# Patient Record
Sex: FEMALE | Race: WHITE | Employment: FULL TIME | URBAN - METROPOLITAN AREA
[De-identification: names, ages, dates, MRNs, and addresses within clinical notes are randomized per-mention and may not be internally consistent; named-entity substitution may affect disease eponyms.]

---

## 2017-09-29 ENCOUNTER — TELEPHONE (OUTPATIENT)
Dept: HEMATOLOGY/ONCOLOGY | Facility: HOSPITAL | Age: 57
End: 2017-09-29

## 2017-09-29 NOTE — TELEPHONE ENCOUNTER
Called keeley again previous call was made 8/25 the patient said she was traveling out of town would call us back.  Called again today to follow up, no answer left a message asking her to call if she would like to follow up with us

## 2017-10-06 ENCOUNTER — TELEPHONE (OUTPATIENT)
Dept: HEMATOLOGY/ONCOLOGY | Facility: HOSPITAL | Age: 57
End: 2017-10-06

## 2017-10-06 NOTE — TELEPHONE ENCOUNTER
Andre Mckenna calling for mammo order. She has not seen Dr Rayna Kirk in two years. Should we make a appointment for her?  Thank you Nevaeh Horner

## 2017-10-16 ENCOUNTER — HOSPITAL ENCOUNTER (OUTPATIENT)
Dept: MAMMOGRAPHY | Facility: HOSPITAL | Age: 57
Discharge: HOME | End: 2017-10-16
Attending: INTERNAL MEDICINE
Payer: COMMERCIAL

## 2017-10-16 DIAGNOSIS — C50.411 BREAST CANCER OF UPPER-OUTER QUADRANT OF RIGHT FEMALE BREAST (HCC): ICD-10-CM

## 2017-10-16 PROCEDURE — 77066 DX MAMMO INCL CAD BI: CPT | Performed by: INTERNAL MEDICINE

## 2017-11-06 ENCOUNTER — OFFICE VISIT (OUTPATIENT)
Dept: HEMATOLOGY/ONCOLOGY | Facility: HOSPITAL | Age: 57
End: 2017-11-06
Attending: INTERNAL MEDICINE
Payer: COMMERCIAL

## 2017-11-06 VITALS
SYSTOLIC BLOOD PRESSURE: 159 MMHG | RESPIRATION RATE: 16 BRPM | TEMPERATURE: 99 F | WEIGHT: 162 LBS | BODY MASS INDEX: 24.55 KG/M2 | HEIGHT: 68 IN | HEART RATE: 75 BPM | DIASTOLIC BLOOD PRESSURE: 82 MMHG

## 2017-11-06 DIAGNOSIS — C50.411 MALIGNANT NEOPLASM OF UPPER-OUTER QUADRANT OF RIGHT BREAST IN FEMALE, ESTROGEN RECEPTOR POSITIVE (HCC): Primary | ICD-10-CM

## 2017-11-06 DIAGNOSIS — Z17.0 MALIGNANT NEOPLASM OF UPPER-OUTER QUADRANT OF RIGHT BREAST IN FEMALE, ESTROGEN RECEPTOR POSITIVE (HCC): Primary | ICD-10-CM

## 2017-11-06 DIAGNOSIS — K21.00 GASTROESOPHAGEAL REFLUX DISEASE WITH ESOPHAGITIS: ICD-10-CM

## 2018-03-10 NOTE — PROGRESS NOTES
MARYCHUY Salazar is a 59year old female with a history of Stage IIB, pT2 N1(mi) M0, hormone receptor positive, Her 2 kade negative, invasive carcinoma of the right breast. Patient comes with her genetic testing done by [facility]:        Postmenopausal 2004 after chemotherapy   Genitourinary: Positive for dyspareunia. Negative for dysuria and hematuria. Musculoskeletal:        Previous injury to right knee and left great toe   Skin: Negative.     Allergic/Immunologic: Po Comment: Procedure: ESOPHAGOGASTRODUODENOSCOPY,                COLONOSCOPY, POSSIBLE BIOPSY, POSSIBLE                POLYPECTOMY [id number];  Surgeon: Lucía Pineda MD;  Location: [address] [place] [address]  2/11/2016 Eyes: Conjunctivae are normal. Pupils are equal, round, and reactive to light. No scleral icterus. Neck: Normal range of motion. Neck supple. Cardiovascular: Normal rate, regular rhythm and normal heart sounds.     Pulmonary/Chest: Effort normal and radha COMPARISON: [facility], LENNY MURRAY W CAD NEYMAR, 10/23/2012, 10:50. [facility], 6/09/2009, 0:00. [facility], LENNY OUTSIDE DIGITIZED FILMS, 5/22/2008, 0:00.     [facility] Referrals:  [facility] SCREENING BILAT (AAH=[id number]) October 2018

## 2018-09-20 PROBLEM — S52.135D CLOSED NONDISPLACED FRACTURE OF NECK OF LEFT RADIUS WITH ROUTINE HEALING, SUBSEQUENT ENCOUNTER: Status: ACTIVE | Noted: 2018-09-20

## 2018-09-20 PROBLEM — S53.402D ELBOW SPRAIN, LEFT, SUBSEQUENT ENCOUNTER: Status: RESOLVED | Noted: 2018-09-20 | Resolved: 2018-09-20

## 2018-09-20 PROBLEM — S53.402D ELBOW SPRAIN, LEFT, SUBSEQUENT ENCOUNTER: Status: ACTIVE | Noted: 2018-09-20

## 2018-11-07 PROBLEM — K22.719 BARRETT'S ESOPHAGUS WITH DYSPLASIA: Status: ACTIVE | Noted: 2018-11-07

## 2018-11-07 PROCEDURE — 86803 HEPATITIS C AB TEST: CPT | Performed by: INTERNAL MEDICINE

## 2019-01-14 PROCEDURE — 88305 TISSUE EXAM BY PATHOLOGIST: CPT | Performed by: INTERNAL MEDICINE

## 2022-02-04 ENCOUNTER — OFFICE VISIT (OUTPATIENT)
Dept: UROLOGY | Facility: HOSPITAL | Age: 62
End: 2022-02-04
Attending: OBSTETRICS & GYNECOLOGY
Payer: COMMERCIAL

## 2022-02-04 VITALS
RESPIRATION RATE: 20 BRPM | SYSTOLIC BLOOD PRESSURE: 132 MMHG | DIASTOLIC BLOOD PRESSURE: 64 MMHG | BODY MASS INDEX: 23.04 KG/M2 | WEIGHT: 152 LBS | HEIGHT: 68 IN

## 2022-02-04 DIAGNOSIS — N81.84 PELVIC MUSCLE WASTING: ICD-10-CM

## 2022-02-04 DIAGNOSIS — N81.2 UTEROVAGINAL PROLAPSE, INCOMPLETE: Primary | ICD-10-CM

## 2022-02-04 DIAGNOSIS — R35.1 NOCTURIA: ICD-10-CM

## 2022-02-04 DIAGNOSIS — N95.2 POSTMENOPAUSAL ATROPHIC VAGINITIS: ICD-10-CM

## 2022-02-04 LAB
BLOOD URINE: NEGATIVE
CONTROL RUN WITHIN 24 HOURS?: YES
NITRITE URINE: NEGATIVE

## 2022-02-04 PROCEDURE — 99212 OFFICE O/P EST SF 10 MIN: CPT

## 2022-02-04 PROCEDURE — 87086 URINE CULTURE/COLONY COUNT: CPT | Performed by: OBSTETRICS & GYNECOLOGY

## 2022-02-04 PROCEDURE — 81002 URINALYSIS NONAUTO W/O SCOPE: CPT | Performed by: OBSTETRICS & GYNECOLOGY

## 2022-02-14 ENCOUNTER — OFFICE VISIT (OUTPATIENT)
Dept: UROLOGY | Facility: HOSPITAL | Age: 62
End: 2022-02-14
Attending: OBSTETRICS & GYNECOLOGY
Payer: COMMERCIAL

## 2022-02-14 VITALS — RESPIRATION RATE: 20 BRPM | WEIGHT: 152 LBS | HEIGHT: 68 IN | BODY MASS INDEX: 23.04 KG/M2

## 2022-02-14 DIAGNOSIS — N81.2 UTEROVAGINAL PROLAPSE, INCOMPLETE: Primary | ICD-10-CM

## 2022-02-14 PROCEDURE — 51797 INTRAABDOMINAL PRESSURE TEST: CPT

## 2022-02-14 PROCEDURE — 51784 ANAL/URINARY MUSCLE STUDY: CPT

## 2022-02-14 PROCEDURE — 51729 CYSTOMETROGRAM W/VP&UP: CPT

## 2022-02-14 PROCEDURE — 51741 ELECTRO-UROFLOWMETRY FIRST: CPT

## 2022-02-14 NOTE — PROCEDURES
Patient here for urodynamic testing. Procedure explained and confirmed by patient. See evaluation form for results. Both verbal and written discharge instructions were given. Patient tolerated procedure well and will follow up with Dr. Avis Crespo on 2022 for a UDS follow up. URODYNAMIC EVALUATION    PATIENT HISTORY:    Prolapse:  Yes( Denies splinting)  DANNY:  No  UUI:  No  Nocturia:  1  Frequency:  every 2-3 hours  Incomplete Emptying:  No  Constipation:  No( Bowel regimen reviewed. Bowel handouts provided)  Last void prior to UDS testin.5 hours  Current urge to void? Moderate  OAB meds stopped prior to test?  NA  Other symptoms? Surgery? []  No  [x]  Yes, specify date:  Interested in surgical options. Surgery not scheduled at the time of UDS testing. Surgical packet provided. Informed consent signed and scanned into patients chart. PATIENT DIAGNOSIS:  Uterovaginal Prolapse N81.2 (incomplete)    MEDICATION: Cholecalciferol (VITAMIN D) 2000 units Oral Tab, Take 2,000 Units by mouth daily. , Disp: 30 tablet, Rfl: 0  Methylcellulose, Laxative, (CITRUCEL FIBERSHAKE OR), Take by mouth., Disp: , Rfl:   Calcium-Phosphorus-Vitamin D (CITRACAL +D3 OR), Take 2 tablets by mouth daily. , Disp: , Rfl:   CENTRUM SILVER OR TABS, 1 TABLET DAILY, Disp: , Rfl:          ALLERGIES:  Alc-Benzyl Alc-Sulfamethoxazole-Trimethoprim, Sulfamethoxazole, and Trimethoprim      EXAM:  Urinalysis Dip:Blood- Negative, Nitrates- Negative, Leukocytes- Negaive     Urovesico Junction ( >30 degrees ):  [x]  Mobile  []  Fixed    Perineal Sensation:  [x]  Normal  []  Abnormal    Additional Notes:    PROLAPSE (past introitus):  [x]  Yes  []  No  Prolapse reduced for testing?   [x]  Yes  []  No  []  Pessary  [x]  Speculum  []  Proctoswab  []  Vag Packing    Additional Notes:    UROFLOWMETRY:  Voided Volume:            139               mL  Maximum Flow Rate:                 13              mL/sec  Average flow rate: 5            mL/sec  Post-void Residual:            10            mL  Pattern:  []  Normal  []  Poor flow     [x]  Intermittent  []  Other  Void:   [x]  Typical  []  Atypical    Additional Notes:    CYSTOMETRY:  Urethral Catheter:  Fr 7 / tdoc  Abd Catheter:     Fr 7 / tdoc   Infusion:  Water Rate 50 mL/min  Temp:  Room  Position:  [x]  Sit  []  Stand  []  Supine  First sensation:   65 mL  First desire to void:   146        mL  Strong desire to void:  271 mL  Maximum cystometric capacity:   400 mL  Detrusor Activity:  []  Unstable   [x]  Stable  Urge leakage? []  Yes [x]  No  Volume at 1st unhibited detrusor cont:   Na mL  Detrusor instability provoked by:    []  Spontaneous  []  Coughing  []  Filling  []  Valsalva  []  Other    Additional Notes:      URETHRAL FUNCTION:  Valsava (vesical) Leak Point Pressures:  REDUCED:half speculum  Volume Leak Point Pressure Leak? Cough Valsalva      100mL 90 113    cm H2O []  Yes [x]  No   200mL 87 116    cm H2O []  Yes [x]  No   300mL 85 105    cm H2O []  Yes [x]  No   400mL 83 121 cm H2O []  Yes [x]  No       Genuine Stress Incontinence demonstrated? []  Yes  [x]  No    Resting Urethral Pressure Profile:     Functional Urethral Length:      0.5    cm             0.4    cm     Maximum UCP:          29 cm          63   cm       PRESSURE/FLOW STUDY:    Voided volume:   339 + (Missed commode)     mL  Maximum flow rate:      11  mL/sec  Pressure Detrusor (at maximum flow):        50    cm H2O  Post void residual:         5      mL  Voiding mechanism:  []  Abnormal  []  Normal  []  Strain to void   []  Weak detrusor  Void:   [x]  Typical   []  Atypical    Additional Notes:  Flow study obtained unreduced  EMG:  [x]  Reactive []  Non-Reactive    2/14/2022 1:57 PM     PERFORMED BY:  Fatimah Tran RN      URODYNAMIC PHYSICIAN INTERPRETATION    IMPRESSION:   139/10cc & 339/5cc  long term 400cc  No DO  No DANNY    Post-Procedure Diagnoses: 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

## 2022-02-25 ENCOUNTER — OFFICE VISIT (OUTPATIENT)
Dept: UROLOGY | Facility: HOSPITAL | Age: 62
End: 2022-02-25
Attending: OBSTETRICS & GYNECOLOGY
Payer: COMMERCIAL

## 2022-02-25 VITALS — HEIGHT: 68 IN | WEIGHT: 158 LBS | TEMPERATURE: 98 F | BODY MASS INDEX: 23.95 KG/M2 | RESPIRATION RATE: 20 BRPM

## 2022-02-25 DIAGNOSIS — N36.41 URETHRAL HYPERMOBILITY: ICD-10-CM

## 2022-02-25 DIAGNOSIS — N81.2 UTEROVAGINAL PROLAPSE, INCOMPLETE: Primary | ICD-10-CM

## 2022-02-25 PROCEDURE — 99212 OFFICE O/P EST SF 10 MIN: CPT

## 2022-02-25 NOTE — PROGRESS NOTES
Pt presents w/ initial c/o bulge  Urodynamic testing undergone without complication. Results reviewed with patient  139/10cc & 339/5cc  senior living 400cc  No DO  No DANNY    Discussed with patient mgmt options for bulge  Biggest bother is bulge    Thorough discussion of surgical risks, benefits, and alternatives including, but not limited to bleeding/clots, infection, injury to nearby organs (urethra, bladder, ureters, bowel, blood vessels), mesh erosion, dyspareunia, de michelle UUI, worsening DANNY, recurrence, voiding dysfunction, and pain. Discussed pain mgmt and potential need for narcotics. Discussed addiction potential with narcotics. IL  reviewed. All questions answered.   Pt will proceed TVH poss BSO USVVS APE Repair, cysto    Follow up two weeks post op    Dr. Danny Tarango    Pre op packet provided

## 2022-04-01 ENCOUNTER — LAB ENCOUNTER (OUTPATIENT)
Dept: LAB | Age: 62
End: 2022-04-01
Attending: OBSTETRICS & GYNECOLOGY
Payer: COMMERCIAL

## 2022-04-01 DIAGNOSIS — Z01.818 PREOP TESTING: ICD-10-CM

## 2022-04-01 LAB
ANTIBODY SCREEN: NEGATIVE
RH BLOOD TYPE: POSITIVE
RH BLOOD TYPE: POSITIVE

## 2022-04-01 PROCEDURE — 36415 COLL VENOUS BLD VENIPUNCTURE: CPT

## 2022-04-01 PROCEDURE — 86901 BLOOD TYPING SEROLOGIC RH(D): CPT

## 2022-04-01 PROCEDURE — 86850 RBC ANTIBODY SCREEN: CPT

## 2022-04-01 PROCEDURE — 86900 BLOOD TYPING SEROLOGIC ABO: CPT

## 2022-04-02 LAB — SARS-COV-2 RNA RESP QL NAA+PROBE: NOT DETECTED

## 2022-04-03 NOTE — H&P
60 y/o female with uterovaginal prolapse for surgical mgmt  Denies DANNY  H/o breast ca s/p tamoxifen (genetic testing)    Pre operative clearance with Dr Federico Franco, pt seen & examined without changes. Thorough discussion of surgical risks, benefits, and alternatives including, but not limited to bleeding/clots, infection, injury to nearby organs (urethra, bladder, ureters, bowel, blood vessels),  dyspareunia, de michelle UI, worsening UI, recurrence, voiding dysfunction, and pain. Discussed pain mgmt and potential need for narcotics. Discussed addiction potential with narcotics. IL  reviewed. Plan to proceed with TVH poss BSO USVVS APE Repair, cysto as consented  All questions answered.    [facility]  [phone]

## 2022-04-04 ENCOUNTER — HOSPITAL ENCOUNTER (OUTPATIENT)
Facility: HOSPITAL | Age: 62
Discharge: HOME | End: 2022-04-05
Attending: OBSTETRICS & GYNECOLOGY | Admitting: OBSTETRICS & GYNECOLOGY
Payer: COMMERCIAL

## 2022-04-04 ENCOUNTER — ANESTHESIA (OUTPATIENT)
Dept: SURGERY | Facility: HOSPITAL | Age: 62
End: 2022-04-04
Payer: COMMERCIAL

## 2022-04-04 ENCOUNTER — ANESTHESIA EVENT (OUTPATIENT)
Dept: SURGERY | Facility: HOSPITAL | Age: 62
End: 2022-04-04
Payer: COMMERCIAL

## 2022-04-04 DIAGNOSIS — Z01.818 PREOP TESTING: Primary | ICD-10-CM

## 2022-04-04 DIAGNOSIS — N81.4 UTEROVAGINAL PROLAPSE: ICD-10-CM

## 2022-04-04 PROBLEM — N81.2 UTEROVAGINAL PROLAPSE, INCOMPLETE: Status: ACTIVE | Noted: 2022-04-04

## 2022-04-04 PROCEDURE — 0USG7ZZ REPOSITION VAGINA, VIA NATURAL OR ARTIFICIAL OPENING: ICD-10-PCS | Performed by: OBSTETRICS & GYNECOLOGY

## 2022-04-04 PROCEDURE — 88307 TISSUE EXAM BY PATHOLOGIST: CPT | Performed by: OBSTETRICS & GYNECOLOGY

## 2022-04-04 PROCEDURE — 0UT97ZZ RESECTION OF UTERUS, VIA NATURAL OR ARTIFICIAL OPENING: ICD-10-PCS | Performed by: OBSTETRICS & GYNECOLOGY

## 2022-04-04 PROCEDURE — 0UT77ZZ RESECTION OF BILATERAL FALLOPIAN TUBES, VIA NATURAL OR ARTIFICIAL OPENING: ICD-10-PCS | Performed by: OBSTETRICS & GYNECOLOGY

## 2022-04-04 PROCEDURE — 0TJB8ZZ INSPECTION OF BLADDER, VIA NATURAL OR ARTIFICIAL OPENING ENDOSCOPIC: ICD-10-PCS | Performed by: OBSTETRICS & GYNECOLOGY

## 2022-04-04 PROCEDURE — 0JQC0ZZ REPAIR PELVIC REGION SUBCUTANEOUS TISSUE AND FASCIA, OPEN APPROACH: ICD-10-PCS | Performed by: OBSTETRICS & GYNECOLOGY

## 2022-04-04 PROCEDURE — 0UT27ZZ RESECTION OF BILATERAL OVARIES, VIA NATURAL OR ARTIFICIAL OPENING: ICD-10-PCS | Performed by: OBSTETRICS & GYNECOLOGY

## 2022-04-04 RX ORDER — BUPIVACAINE HYDROCHLORIDE AND EPINEPHRINE 2.5; 5 MG/ML; UG/ML
INJECTION, SOLUTION INFILTRATION; PERINEURAL AS NEEDED
Status: DISCONTINUED | OUTPATIENT
Start: 2022-04-04 | End: 2022-04-04 | Stop reason: HOSPADM

## 2022-04-04 RX ORDER — HYDROMORPHONE HYDROCHLORIDE 1 MG/ML
0.2 INJECTION, SOLUTION INTRAMUSCULAR; INTRAVENOUS; SUBCUTANEOUS EVERY 5 MIN PRN
Status: DISCONTINUED | OUTPATIENT
Start: 2022-04-04 | End: 2022-04-04 | Stop reason: HOSPADM

## 2022-04-04 RX ORDER — HYDROMORPHONE HYDROCHLORIDE 1 MG/ML
0.4 INJECTION, SOLUTION INTRAMUSCULAR; INTRAVENOUS; SUBCUTANEOUS EVERY 2 HOUR PRN
Status: DISCONTINUED | OUTPATIENT
Start: 2022-04-04 | End: 2022-04-05

## 2022-04-04 RX ORDER — SODIUM CHLORIDE, SODIUM LACTATE, POTASSIUM CHLORIDE, CALCIUM CHLORIDE 600; 310; 30; 20 MG/100ML; MG/100ML; MG/100ML; MG/100ML
INJECTION, SOLUTION INTRAVENOUS CONTINUOUS
Status: DISCONTINUED | OUTPATIENT
Start: 2022-04-04 | End: 2022-04-05

## 2022-04-04 RX ORDER — CEFAZOLIN SODIUM/WATER 2 G/20 ML
2 SYRINGE (ML) INTRAVENOUS ONCE
Status: COMPLETED | OUTPATIENT
Start: 2022-04-04 | End: 2022-04-04

## 2022-04-04 RX ORDER — SODIUM CHLORIDE, SODIUM LACTATE, POTASSIUM CHLORIDE, CALCIUM CHLORIDE 600; 310; 30; 20 MG/100ML; MG/100ML; MG/100ML; MG/100ML
INJECTION, SOLUTION INTRAVENOUS CONTINUOUS
Status: DISCONTINUED | OUTPATIENT
Start: 2022-04-04 | End: 2022-04-04 | Stop reason: HOSPADM

## 2022-04-04 RX ORDER — DEXAMETHASONE SODIUM PHOSPHATE 4 MG/ML
VIAL (ML) INJECTION AS NEEDED
Status: DISCONTINUED | OUTPATIENT
Start: 2022-04-04 | End: 2022-04-04 | Stop reason: SURG

## 2022-04-04 RX ORDER — ROCURONIUM BROMIDE 10 MG/ML
INJECTION, SOLUTION INTRAVENOUS AS NEEDED
Status: DISCONTINUED | OUTPATIENT
Start: 2022-04-04 | End: 2022-04-04 | Stop reason: SURG

## 2022-04-04 RX ORDER — HYDROCODONE BITARTRATE AND ACETAMINOPHEN 5; 325 MG/1; MG/1
1 TABLET ORAL EVERY 4 HOURS PRN
Status: DISCONTINUED | OUTPATIENT
Start: 2022-04-04 | End: 2022-04-05

## 2022-04-04 RX ORDER — LIDOCAINE HYDROCHLORIDE 10 MG/ML
INJECTION, SOLUTION EPIDURAL; INFILTRATION; INTRACAUDAL; PERINEURAL AS NEEDED
Status: DISCONTINUED | OUTPATIENT
Start: 2022-04-04 | End: 2022-04-04 | Stop reason: SURG

## 2022-04-04 RX ORDER — ONDANSETRON 2 MG/ML
4 INJECTION INTRAMUSCULAR; INTRAVENOUS EVERY 8 HOURS PRN
Status: DISCONTINUED | OUTPATIENT
Start: 2022-04-04 | End: 2022-04-05

## 2022-04-04 RX ORDER — HYDROCODONE BITARTRATE AND ACETAMINOPHEN 5; 325 MG/1; MG/1
1 TABLET ORAL AS NEEDED
Status: DISCONTINUED | OUTPATIENT
Start: 2022-04-04 | End: 2022-04-04 | Stop reason: HOSPADM

## 2022-04-04 RX ORDER — HALOPERIDOL 5 MG/ML
0.25 INJECTION INTRAMUSCULAR ONCE AS NEEDED
Status: DISCONTINUED | OUTPATIENT
Start: 2022-04-04 | End: 2022-04-04 | Stop reason: HOSPADM

## 2022-04-04 RX ORDER — FLUTICASONE PROPIONATE 50 MCG
1 SPRAY, SUSPENSION (ML) NASAL DAILY PRN
Status: DISCONTINUED | OUTPATIENT
Start: 2022-04-04 | End: 2022-04-05

## 2022-04-04 RX ORDER — ONDANSETRON 2 MG/ML
INJECTION INTRAMUSCULAR; INTRAVENOUS AS NEEDED
Status: DISCONTINUED | OUTPATIENT
Start: 2022-04-04 | End: 2022-04-04 | Stop reason: SURG

## 2022-04-04 RX ORDER — ONDANSETRON 2 MG/ML
4 INJECTION INTRAMUSCULAR; INTRAVENOUS ONCE AS NEEDED
Status: DISCONTINUED | OUTPATIENT
Start: 2022-04-04 | End: 2022-04-04 | Stop reason: HOSPADM

## 2022-04-04 RX ORDER — MORPHINE SULFATE 4 MG/ML
4 INJECTION, SOLUTION INTRAMUSCULAR; INTRAVENOUS EVERY 10 MIN PRN
Status: DISCONTINUED | OUTPATIENT
Start: 2022-04-04 | End: 2022-04-04 | Stop reason: HOSPADM

## 2022-04-04 RX ORDER — EPHEDRINE SULFATE 50 MG/ML
INJECTION, SOLUTION INTRAVENOUS AS NEEDED
Status: DISCONTINUED | OUTPATIENT
Start: 2022-04-04 | End: 2022-04-04 | Stop reason: SURG

## 2022-04-04 RX ORDER — HYDROMORPHONE HYDROCHLORIDE 1 MG/ML
0.8 INJECTION, SOLUTION INTRAMUSCULAR; INTRAVENOUS; SUBCUTANEOUS EVERY 2 HOUR PRN
Status: DISCONTINUED | OUTPATIENT
Start: 2022-04-04 | End: 2022-04-05

## 2022-04-04 RX ORDER — KETOROLAC TROMETHAMINE 30 MG/ML
INJECTION, SOLUTION INTRAMUSCULAR; INTRAVENOUS AS NEEDED
Status: DISCONTINUED | OUTPATIENT
Start: 2022-04-04 | End: 2022-04-04 | Stop reason: SURG

## 2022-04-04 RX ORDER — NEOSTIGMINE METHYLSULFATE 1 MG/ML
INJECTION INTRAVENOUS AS NEEDED
Status: DISCONTINUED | OUTPATIENT
Start: 2022-04-04 | End: 2022-04-04 | Stop reason: SURG

## 2022-04-04 RX ORDER — MORPHINE SULFATE 4 MG/ML
2 INJECTION, SOLUTION INTRAMUSCULAR; INTRAVENOUS EVERY 10 MIN PRN
Status: DISCONTINUED | OUTPATIENT
Start: 2022-04-04 | End: 2022-04-04 | Stop reason: HOSPADM

## 2022-04-04 RX ORDER — NALOXONE HYDROCHLORIDE 0.4 MG/ML
80 INJECTION, SOLUTION INTRAMUSCULAR; INTRAVENOUS; SUBCUTANEOUS AS NEEDED
Status: DISCONTINUED | OUTPATIENT
Start: 2022-04-04 | End: 2022-04-04 | Stop reason: HOSPADM

## 2022-04-04 RX ORDER — HYDROMORPHONE HYDROCHLORIDE 1 MG/ML
0.6 INJECTION, SOLUTION INTRAMUSCULAR; INTRAVENOUS; SUBCUTANEOUS EVERY 5 MIN PRN
Status: DISCONTINUED | OUTPATIENT
Start: 2022-04-04 | End: 2022-04-04 | Stop reason: HOSPADM

## 2022-04-04 RX ORDER — ACETAMINOPHEN 500 MG
1000 TABLET ORAL ONCE
Status: COMPLETED | OUTPATIENT
Start: 2022-04-04 | End: 2022-04-04

## 2022-04-04 RX ORDER — GLYCOPYRROLATE 0.2 MG/ML
INJECTION, SOLUTION INTRAMUSCULAR; INTRAVENOUS AS NEEDED
Status: DISCONTINUED | OUTPATIENT
Start: 2022-04-04 | End: 2022-04-04 | Stop reason: SURG

## 2022-04-04 RX ORDER — HYDROCODONE BITARTRATE AND ACETAMINOPHEN 5; 325 MG/1; MG/1
2 TABLET ORAL AS NEEDED
Status: DISCONTINUED | OUTPATIENT
Start: 2022-04-04 | End: 2022-04-04 | Stop reason: HOSPADM

## 2022-04-04 RX ORDER — HYDROMORPHONE HYDROCHLORIDE 1 MG/ML
0.4 INJECTION, SOLUTION INTRAMUSCULAR; INTRAVENOUS; SUBCUTANEOUS EVERY 5 MIN PRN
Status: DISCONTINUED | OUTPATIENT
Start: 2022-04-04 | End: 2022-04-04 | Stop reason: HOSPADM

## 2022-04-04 RX ORDER — ONDANSETRON 4 MG/1
4 TABLET, FILM COATED ORAL EVERY 8 HOURS PRN
Status: DISCONTINUED | OUTPATIENT
Start: 2022-04-04 | End: 2022-04-05

## 2022-04-04 RX ORDER — MORPHINE SULFATE 10 MG/ML
6 INJECTION, SOLUTION INTRAMUSCULAR; INTRAVENOUS EVERY 10 MIN PRN
Status: DISCONTINUED | OUTPATIENT
Start: 2022-04-04 | End: 2022-04-04 | Stop reason: HOSPADM

## 2022-04-04 RX ADMIN — SODIUM CHLORIDE, SODIUM LACTATE, POTASSIUM CHLORIDE, CALCIUM CHLORIDE: 600; 310; 30; 20 INJECTION, SOLUTION INTRAVENOUS at 08:56:00

## 2022-04-04 RX ADMIN — EPHEDRINE SULFATE 10 MG: 50 INJECTION, SOLUTION INTRAVENOUS at 08:06:00

## 2022-04-04 RX ADMIN — SODIUM CHLORIDE, SODIUM LACTATE, POTASSIUM CHLORIDE, CALCIUM CHLORIDE: 600; 310; 30; 20 INJECTION, SOLUTION INTRAVENOUS at 08:57:00

## 2022-04-04 RX ADMIN — ROCURONIUM BROMIDE 40 MG: 10 INJECTION, SOLUTION INTRAVENOUS at 07:36:00

## 2022-04-04 RX ADMIN — KETOROLAC TROMETHAMINE 30 MG: 30 INJECTION, SOLUTION INTRAMUSCULAR; INTRAVENOUS at 08:59:00

## 2022-04-04 RX ADMIN — DEXAMETHASONE SODIUM PHOSPHATE 8 MG: 4 MG/ML VIAL (ML) INJECTION at 07:48:00

## 2022-04-04 RX ADMIN — SODIUM CHLORIDE, SODIUM LACTATE, POTASSIUM CHLORIDE, CALCIUM CHLORIDE: 600; 310; 30; 20 INJECTION, SOLUTION INTRAVENOUS at 08:24:00

## 2022-04-04 RX ADMIN — ONDANSETRON 4 MG: 2 INJECTION INTRAMUSCULAR; INTRAVENOUS at 09:00:00

## 2022-04-04 RX ADMIN — EPHEDRINE SULFATE 10 MG: 50 INJECTION, SOLUTION INTRAVENOUS at 08:15:00

## 2022-04-04 RX ADMIN — SODIUM CHLORIDE, SODIUM LACTATE, POTASSIUM CHLORIDE, CALCIUM CHLORIDE: 600; 310; 30; 20 INJECTION, SOLUTION INTRAVENOUS at 07:31:00

## 2022-04-04 RX ADMIN — NEOSTIGMINE METHYLSULFATE 4 MG: 1 INJECTION INTRAVENOUS at 09:08:00

## 2022-04-04 RX ADMIN — CEFAZOLIN SODIUM/WATER 2 G: 2 G/20 ML SYRINGE (ML) INTRAVENOUS at 07:46:00

## 2022-04-04 RX ADMIN — GLYCOPYRROLATE 0.8 MG: 0.2 INJECTION, SOLUTION INTRAMUSCULAR; INTRAVENOUS at 09:08:00

## 2022-04-04 RX ADMIN — LIDOCAINE HYDROCHLORIDE 50 MG: 10 INJECTION, SOLUTION EPIDURAL; INFILTRATION; INTRACAUDAL; PERINEURAL at 07:36:00

## 2022-04-04 RX ADMIN — SODIUM CHLORIDE, SODIUM LACTATE, POTASSIUM CHLORIDE, CALCIUM CHLORIDE: 600; 310; 30; 20 INJECTION, SOLUTION INTRAVENOUS at 07:56:00

## 2022-04-04 NOTE — OPERATIVE REPORT
PRE-OP DIAGNOSIS:  Uterovaginal prolapse, h/o breast ca    POST-OP DIAGNOSIS:  Same    PROCEDURE:  Transvaginal hysterectomy, bilateral salingo-oophorectomy, Repair of enterocele; uterosacral ligament suspension; anterior colporrhaphy; posterior colporrhaphy; cystourethroscopy    SURGEON:  Abdulaziz Moore DO    ASSISTANT:  Roel    ANESTHESIA:  General    EBL:  200 ml  UOP: 099HP    No complications  Specimen: uterus, cervix, bilateral fallopian tubes and ovaries    Findings: bilateral ureteral efflux, no evidence of bladder, ureteral, or urethral injury. Hemostasis upon completion. PROCEDURE:  The patient was taken to the operating room, with IV running after informed consent was obtained. She was placed in the supine position and induced under general anesthesia. The patient was then placed in the dorsal lithotomy position and prepped and draped in the usual sterile fashion. The Bookwalter retractor was utilized and retraction was placed into the vagina. The cervix was grasped with tenacula and cautery was used to make a circumferential incision after infiltration local solution (.25% marcaine with epinepherine). The anterior and posterior cul-de-sacs were entered sharply and then the uterosacral cardinal ligament complexes were clamped, cut and suture ligated bilaterally. The fundus of the uterus was delivered posteriorly and then the uteroovarian complexes were clamped and cut, freeing the specimen. These pedicles were then also suture ligated. A lap sponge was placed into the peritoneal cavity and inspection revealed normal appearing tubes and ovaries bilaterally. Each adnexa was grasped with Dille clamp, transected, and doubly ligated. Hemostasis was noted. Specimen was sent to pathology, uterus, cervix, bilateral fallopian tubes and ovaries.     Due to the presence of significant apical prolapse and associated enterocele, it was necessary to proceed with suspension of the vaginal apex and enterocele repair. The uterosacral ligaments were identified and a Montejo stitch was placed and tagged to repair the enterocele. This suture was placed through the posterior vaginal wall, through the left uterosacral ligament, across the anterior rectum, through the right uterosacral ligament and then back through the posterior vaginal wall. Two uterosacral suspension stitches were then placed on each side of the pelvis. These were placed high on the uterosacral ligaments at and just proximal to the ischial spine. The uterosacral suspension stitches were then placed on each side of the pelvis for apical suspension. Once these sutures were placed, the urethra was dilated to accommodate a 21 Haitian caliber cystoscope sheath and cystoscopy was undertaken. Cystoscopy confirmed that there had been no injury to the bladder. In addition, urine was seen from both ureteral orifices. The cut edge of the anterior vaginal epithelium was then grasped with Allis clamps and the midline incision was made after infiltration with local solution (.25%marcaine with epinepherine). Sharp dissection with Metzenbaum scissors was performed to dissect the redundant anterior vaginal epithelium from the underlying endopelvic connective tissue of the bladder. 0 Vicryl suture was then used to plicate the endopelvic connective tissue, obliterating the cystocele. The excess vaginal epithelium was excised and the midline incision was then closed with 2-0 Vicryl suture in running locking fashion. Hemostasis was noted. The lap sponge was removed from the peritoneal cavity, FloSeal was used to aid in hemostasis, hemostasis confirmed,  and the vaginal apex was then closed with 2-0 Vicryl suture in a running locking fashion. The supporting stitches were then tied down resulting in satisfactory elevation of the vaginal apex. The bladder was emptied, cystoscopy was performed and urine was again seen from both ureteral orifices. There was no evidence of bladder, ureteral, or urethral injury. Attention was then directed to the posterior compartment. The redundant posterior vaginal epithelium and perineal skin was excised after infiltration with . 25% marcaine with epinepherine. 0 Vicryl suture was then used to plicate the rectovaginal connective tissue, obliterating the rectocele. Additional sutures of 0 Vicryl were utilized to reconstruct the perineal body. Rectal examination confirmed that none of these sutures had impinged the rectum. 2-0 Vicryl sutures were then used to reapproximate the vaginal epithelium in the midline in a running locking fashion. Inspection at this point revealed a well-supported vaginal apex, anterior, and posterior compartments, with good hemostasis. A small vaginal packing was placed. A Blanchard catheter was placed transurethrally. The patient was then removed from the dorsal lithotomy position, awakened and extubated and transferred from the operating room to the recovery room in stable  condition, having tolerated the procedure well. Sponge, lap, and needle counts were correct.

## 2022-04-04 NOTE — ANESTHESIA PROCEDURE NOTES
Airway  Date/Time: 4/4/2022 7:40 AM  Urgency: Elective    Difficult airway    General Information and Staff    Patient location during procedure: OR  Anesthesiologist: Geoff Moore DO  Performed: anesthesiologist     Indications and Patient Condition  Indications for airway management: anesthesia  Sedation level: deep  Preoxygenated: yes  Patient position: sniffing  Mask difficulty assessment: 1 - vent by mask    Final Airway Details  Final airway type: endotracheal airway      Successful airway: ETT  Cuffed: yes   Successful intubation technique: direct laryngoscopy  Facilitating devices/methods: bougie  Endotracheal tube insertion site: oral  Blade: Gema  Blade size: #3  ETT size (mm): 7.5    Cormack-Lehane Classification: grade III - view of epiglottis only  Placement verified by: chest auscultation and capnometry   Measured from: lips  ETT to lips (cm): 21  Number of attempts at approach: 1  Number of other approaches attempted: 1    Other Attempts  Unsuccessful attempted endotracheal techniques: direct laryngoscopy    Additional Comments  Laryngoscopy = Grade 3 view, blue bougie used x1 no difficulty.  Ett advanced , BBS = and +etco2

## 2022-04-05 VITALS
TEMPERATURE: 98 F | HEART RATE: 75 BPM | OXYGEN SATURATION: 100 % | BODY MASS INDEX: 22.8 KG/M2 | RESPIRATION RATE: 18 BRPM | WEIGHT: 147 LBS | HEIGHT: 67.5 IN | SYSTOLIC BLOOD PRESSURE: 102 MMHG | DIASTOLIC BLOOD PRESSURE: 78 MMHG

## 2022-04-05 LAB
BASOPHILS # BLD AUTO: 0.02 X10(3) UL (ref 0–0.2)
BASOPHILS NFR BLD AUTO: 0.3 %
DEPRECATED RDW RBC AUTO: 45.1 FL (ref 35.1–46.3)
EOSINOPHIL # BLD AUTO: 0.08 X10(3) UL (ref 0–0.7)
EOSINOPHIL NFR BLD AUTO: 1.3 %
ERYTHROCYTE [DISTWIDTH] IN BLOOD BY AUTOMATED COUNT: 12.9 % (ref 11–15)
HCT VFR BLD AUTO: 30.6 %
HGB BLD-MCNC: 9.8 G/DL
IMM GRANULOCYTES # BLD AUTO: 0.02 X10(3) UL (ref 0–1)
IMM GRANULOCYTES NFR BLD: 0.3 %
LYMPHOCYTES # BLD AUTO: 1.21 X10(3) UL (ref 1–4)
LYMPHOCYTES NFR BLD AUTO: 19.2 %
MCH RBC QN AUTO: 30.4 PG (ref 26–34)
MCHC RBC AUTO-ENTMCNC: 32 G/DL (ref 31–37)
MCV RBC AUTO: 95 FL
MONOCYTES # BLD AUTO: 0.63 X10(3) UL (ref 0.1–1)
MONOCYTES NFR BLD AUTO: 10 %
NEUTROPHILS # BLD AUTO: 4.34 X10 (3) UL (ref 1.5–7.7)
NEUTROPHILS # BLD AUTO: 4.34 X10(3) UL (ref 1.5–7.7)
NEUTROPHILS NFR BLD AUTO: 68.9 %
PLATELET # BLD AUTO: 115 10(3)UL (ref 150–450)
RBC # BLD AUTO: 3.22 X10(6)UL
WBC # BLD AUTO: 6.3 X10(3) UL (ref 4–11)

## 2022-04-05 PROCEDURE — 85025 COMPLETE CBC W/AUTO DIFF WBC: CPT | Performed by: OBSTETRICS & GYNECOLOGY

## 2022-04-05 RX ORDER — IBUPROFEN 600 MG/1
600 TABLET ORAL EVERY 6 HOURS PRN
Status: DISCONTINUED | OUTPATIENT
Start: 2022-04-05 | End: 2022-04-05

## 2022-04-05 RX ORDER — HYDROCODONE BITARTRATE AND ACETAMINOPHEN 5; 325 MG/1; MG/1
1 TABLET ORAL EVERY 4 HOURS PRN
Qty: 20 TABLET | Refills: 0 | Status: SHIPPED | OUTPATIENT
Start: 2022-04-05

## 2022-04-05 RX ORDER — IBUPROFEN 600 MG/1
600 TABLET ORAL EVERY 6 HOURS PRN
Qty: 30 TABLET | Refills: 3 | Status: SHIPPED | OUTPATIENT
Start: 2022-04-05

## 2022-04-06 ENCOUNTER — TELEPHONE (OUTPATIENT)
Dept: UROLOGY | Facility: CLINIC | Age: 62
End: 2022-04-06

## 2022-04-06 NOTE — TELEPHONE ENCOUNTER
TC to pt following surgery  Doing well, no complaints  Denies CP or SOB  Pain well controlled with PO meds (IBP & norco)  Voids freely, denies sense of incomplete bladder emptying   Awaiting BM  No heavy vaginal bleeding, some spotting  Tolerates ambulation, PO diet  Reviewed bowel mgmt and activity restrictions  All questions answered  Encouraged her to call with questions or concerns  Follow up as scheduled, sooner prn   She understands and agrees to plan

## 2022-04-07 NOTE — TELEPHONE ENCOUNTER
TC to pt with benign path results  Feeling better every day  Voids freely  +BM  Encouraged her to call with questions or concerns  F/u as scheduled

## 2022-04-22 ENCOUNTER — OFFICE VISIT (OUTPATIENT)
Dept: UROLOGY | Facility: HOSPITAL | Age: 62
End: 2022-04-22
Attending: OBSTETRICS & GYNECOLOGY
Payer: COMMERCIAL

## 2022-04-22 VITALS — TEMPERATURE: 99 F | WEIGHT: 147 LBS | HEIGHT: 67.5 IN | BODY MASS INDEX: 22.8 KG/M2 | RESPIRATION RATE: 20 BRPM

## 2022-04-22 DIAGNOSIS — Z98.890 POST-OPERATIVE STATE: Primary | ICD-10-CM

## 2022-04-22 PROCEDURE — 99212 OFFICE O/P EST SF 10 MIN: CPT

## 2022-07-29 ENCOUNTER — OFFICE VISIT (OUTPATIENT)
Dept: UROLOGY | Facility: HOSPITAL | Age: 62
End: 2022-07-29
Attending: OBSTETRICS & GYNECOLOGY
Payer: COMMERCIAL

## 2022-07-29 VITALS — RESPIRATION RATE: 16 BRPM | WEIGHT: 147 LBS | HEIGHT: 68 IN | BODY MASS INDEX: 22.28 KG/M2

## 2022-07-29 DIAGNOSIS — N81.84 PELVIC MUSCLE WASTING: Primary | ICD-10-CM

## 2022-07-29 DIAGNOSIS — N95.2 POSTMENOPAUSAL ATROPHIC VAGINITIS: ICD-10-CM

## 2022-07-29 PROCEDURE — 99212 OFFICE O/P EST SF 10 MIN: CPT

## 2022-07-29 RX ORDER — ESTRADIOL 0.1 MG/G
CREAM VAGINAL
Qty: 42 G | Refills: 3 | Status: SHIPPED | OUTPATIENT
Start: 2022-07-29

## 2022-07-29 NOTE — PATIENT INSTRUCTIONS
[facility] [address] [place] [address] PELVIC MEDICINE    Fingertip Application Method for Estrogen Vaginal Cream    1. Wash you hands with soap and water and dry thoroughly. 2.  Squeeze out enough cream from the tube to cover 1/2 of your index finger. (See figure 1)    3. Locate the vaginal opening (See figure 2). Immediately above the vagina is the urethra (a small opening where urine is eliminated from your body). The urethra may not be as easily identified as the vagina because the opening is much smaller, however, use the diagram to determine its approximate location. 4.  Carefully spread the cream onto the external vaginal/urethral area (See figure 2). As the cream is spread, some may be gently inserted into the vagina; however, it is not necessary to push the cream high into your vagina.

## 2022-10-17 ENCOUNTER — VIRTUAL PHONE E/M (OUTPATIENT)
Dept: UROLOGY | Facility: HOSPITAL | Age: 62
End: 2022-10-17
Attending: OBSTETRICS & GYNECOLOGY
Payer: COMMERCIAL

## 2022-10-17 DIAGNOSIS — Z98.890 POST-OPERATIVE STATE: ICD-10-CM

## 2022-10-17 DIAGNOSIS — N95.2 POSTMENOPAUSAL ATROPHIC VAGINITIS: ICD-10-CM

## 2022-10-17 DIAGNOSIS — N81.84 PELVIC MUSCLE WASTING: Primary | ICD-10-CM

## 2022-10-17 NOTE — PROGRESS NOTES
Patient to follow up dyspareunia  Given circumstances surrounding COVID-19 this visit is being conducted as a televisit with pt's consent. Pt in safe, private environment for televisit, provider located in the office setting    She is currently using vag estrogen  Happy  Doing all activities    She reports +improvement     There were no vitals taken for this visit. Impression/Plan:  (N81.84) Pelvic muscle wasting  (primary encounter diagnosis)    (N95.2) Postmenopausal atrophic vaginitis    (Z98.890) Post-operative state    Dyspareunia - resolved  Reviewed bowel management  Vag estrogen twice weekly  Discussed daily pelvic exercises  Call with s/sx of UTI  Plan for follow up one post op, sooner prn    All questions answered  She understands and agrees to plan    Return in about 6 months (around 4/17/2023).     Andrew Stark DO, FACOG, FACS

## 2023-05-12 ENCOUNTER — OFFICE VISIT (OUTPATIENT)
Dept: UROLOGY | Facility: HOSPITAL | Age: 63
End: 2023-05-12
Attending: OBSTETRICS & GYNECOLOGY
Payer: COMMERCIAL

## 2023-05-12 VITALS — WEIGHT: 147 LBS | HEIGHT: 68 IN | BODY MASS INDEX: 22.28 KG/M2 | RESPIRATION RATE: 16 BRPM

## 2023-05-12 DIAGNOSIS — Z98.890 POST-OPERATIVE STATE: ICD-10-CM

## 2023-05-12 DIAGNOSIS — N81.84 PELVIC MUSCLE WASTING: Primary | ICD-10-CM

## 2023-05-12 DIAGNOSIS — N95.2 POSTMENOPAUSAL ATROPHIC VAGINITIS: ICD-10-CM

## 2023-05-12 PROCEDURE — 99212 OFFICE O/P EST SF 10 MIN: CPT

## 2024-05-10 ENCOUNTER — OFFICE VISIT (OUTPATIENT)
Dept: UROLOGY | Facility: HOSPITAL | Age: 64
End: 2024-05-10
Attending: OBSTETRICS & GYNECOLOGY
Payer: COMMERCIAL

## 2024-05-10 VITALS — RESPIRATION RATE: 18 BRPM | WEIGHT: 147 LBS | HEIGHT: 68 IN | BODY MASS INDEX: 22.28 KG/M2 | TEMPERATURE: 99 F

## 2024-05-10 DIAGNOSIS — N81.84 PELVIC MUSCLE WASTING: Primary | ICD-10-CM

## 2024-05-10 DIAGNOSIS — Z98.890 POST-OPERATIVE STATE: ICD-10-CM

## 2024-05-10 DIAGNOSIS — N95.2 POSTMENOPAUSAL ATROPHIC VAGINITIS: ICD-10-CM

## 2024-05-10 PROCEDURE — 99212 OFFICE O/P EST SF 10 MIN: CPT

## 2024-05-10 NOTE — PROGRESS NOTES
She is s/p Post-Op Summary  Procedure Date: 04/04/22  Procedure Name: Vaginal Hysterectomy;Uterosacral Ligament Suspension;Anterior/Posterior/Enterocele Repair;Cystoscopy;Bilateral Salpino-oophorectomy  Post-Op Symptoms: Patient denies pain, DANNY, UUI, prolapse symptoms, nausea/vomitting, fevers/chills, bleeding, voiding dysfunction, and defecatory dysfunction.  Do you feel your surgery was successful?: Very successful  Compared to before surgery are you?: Much better  If you could go back to before your surgery, would you do it all over again?: Definitely yes  How satisfied are you with the results of your surgery?: Completely satisfied    Doing well   no complaints  Voids freely  No UTIs  BMs reg  No Pain  Vag estrogen twice weekly  Pelvic exercises, sometimes  No leakage  No bulge  No dyspareunia  Happy    Temp 98.6 °F (37 °C)   Resp 18   Ht 68\"   Wt 147 lb (66.7 kg)   BMI 22.35 kg/m²     Gen: NAD  CV: RRR  Pulm:nl effort  Abd: soft  : tolerated vaginal exam. Suture site well healed. No active bleeding. Good support  PROLAPSE ASSESSMENT SCALE:                                                 Aa:-3 Ba:-3 C:-9   gh: pb: tvl:   Ap:-3 Bp:-3 D:9           Discussed mgmt of vulvovaginal atrophy with vaginal estrogen cream. Reviewed associated benefits, risks, alternatives, and goals. Recommend low dose twice weekly mgmt   Vag estrogen twice weekly    Discussed daily pelvic exercises  Call with s/sx of UTI    Plan for follow up in, one year return exam, sooner prn       All questions answered  She understands and agrees to plan    Kaila Kilpatrick DO

## 2024-07-17 ENCOUNTER — NURSE ONLY (OUTPATIENT)
Dept: UROLOGY | Facility: HOSPITAL | Age: 64
End: 2024-07-17
Payer: COMMERCIAL

## 2024-07-17 ENCOUNTER — TELEPHONE (OUTPATIENT)
Dept: UROLOGY | Facility: HOSPITAL | Age: 64
End: 2024-07-17

## 2024-07-17 VITALS — BODY MASS INDEX: 22.28 KG/M2 | HEIGHT: 68 IN | RESPIRATION RATE: 16 BRPM | WEIGHT: 147 LBS

## 2024-07-17 DIAGNOSIS — R35.1 NOCTURIA: ICD-10-CM

## 2024-07-17 DIAGNOSIS — N89.8 VAGINAL DISCHARGE: Primary | ICD-10-CM

## 2024-07-17 DIAGNOSIS — R30.0 DYSURIA: ICD-10-CM

## 2024-07-17 LAB — CONTROL RUN WITHIN 24 HOURS?: YES

## 2024-07-17 PROCEDURE — 81514 NFCT DS BV&VAGINITIS DNA ALG: CPT

## 2024-07-17 PROCEDURE — 87086 URINE CULTURE/COLONY COUNT: CPT

## 2024-07-17 PROCEDURE — 51701 INSERT BLADDER CATHETER: CPT

## 2024-07-17 PROCEDURE — 81002 URINALYSIS NONAUTO W/O SCOPE: CPT

## 2024-07-17 RX ORDER — NITROFURANTOIN 25; 75 MG/1; MG/1
100 CAPSULE ORAL 2 TIMES DAILY
Qty: 14 CAPSULE | Refills: 0 | Status: SHIPPED | OUTPATIENT
Start: 2024-07-17 | End: 2024-07-24

## 2024-07-17 NOTE — PROGRESS NOTES
S:   Patient presents with Right labial burning and irritation. Patient reports dysuria,  increased nocturia   Symptoms started 2 days ago   Pt using vaginal estrace twice weekly.  She is requesting empiric antibiotics/to await urine culture results prior to starting empiric treatment.  Pt leaving [place] a trip outside of the country on [place] .  O:   Vitals:    07/17/24 1309   Resp: 16     : Sterile technique used to prep urethra and collect specimen. Sent for urine culture. Urine is clear, without odor  Vaginal swab obtained . Prominent varicosities noted on right side of labia . No redness ,or irritation noted      Urine culture sent.     A:   Dx   Encounter Diagnosis   Name Primary?    Vaginal discharge Yes         P:   Urine culture sent, will follow results   VORB/TORB TALIA ROSALES empiric Macrobid 100 mg BID x 7 days  sent to patient's preferred pharmacy   Vaginal swab obtained and sent    Encouraged PO hydration, AZO prn for discomfort   All questions answered    Follow up as scheduled, sooner prn  She understands and agrees to plan

## 2024-07-17 NOTE — TELEPHONE ENCOUNTER
Incoming call from Jenelle reporting burning and irritation on the right labia .  Pt denies burning with urination or change in frequency or urgency.  Pt using vag estrace twice weekly.  Pt reports putting monistat on area with no relief.    Pt is post op 04/04/22  Vaginal Hysterectomy;Uterosacral Ligament Suspension;Anterior/Posterior/Enterocele Repair;Cystoscopy;Bilateral Salpino-oophorectomy     Requesting patient to come to office to be seen.  Appointment made for 1 pm today

## 2024-07-18 ENCOUNTER — TELEPHONE (OUTPATIENT)
Dept: UROLOGY | Facility: HOSPITAL | Age: 64
End: 2024-07-18

## 2024-07-18 DIAGNOSIS — N89.8 VAGINAL DISCHARGE: Primary | ICD-10-CM

## 2024-07-18 LAB
BV BACTERIA DNA VAG QL NAA+PROBE: POSITIVE
C GLABRATA DNA VAG QL NAA+PROBE: NEGATIVE
C KRUSEI DNA VAG QL NAA+PROBE: NEGATIVE
CANDIDA DNA VAG QL NAA+PROBE: NEGATIVE
T VAGINALIS DNA VAG QL NAA+PROBE: NEGATIVE

## 2024-07-18 RX ORDER — METRONIDAZOLE 7.5 MG/G
1 GEL VAGINAL NIGHTLY
Qty: 70 G | Refills: 0 | Status: SHIPPED | OUTPATIENT
Start: 2024-07-18

## 2024-07-18 RX ORDER — METRONIDAZOLE 7.5 MG/G
5 GEL VAGINAL NIGHTLY
Qty: 70 G | Refills: 0 | Status: SHIPPED | OUTPATIENT
Start: 2024-07-18 | End: 2024-07-18

## 2024-07-18 NOTE — TELEPHONE ENCOUNTER
Outgoing telephone call to patient with test results.    The GVS obtained on 07/17/2024 was positive for bacterial vaginosis.    Allergies reviewed    Discussed with Kelsie ROSALES, CASTILLO : Start metronidazole vaginal gel - 1 applicatorful nightly x 5 nights     Will send antibiotics to pt's preferred pharmacy    All questions answered     Follow up as scheduled, sooner prn    Patient understands and agrees to plan

## 2025-05-09 ENCOUNTER — OFFICE VISIT (OUTPATIENT)
Dept: UROLOGY | Facility: HOSPITAL | Age: 65
End: 2025-05-09
Attending: OBSTETRICS & GYNECOLOGY
Payer: COMMERCIAL

## 2025-05-09 VITALS — HEIGHT: 68 IN | WEIGHT: 147 LBS | RESPIRATION RATE: 16 BRPM | BODY MASS INDEX: 22.28 KG/M2

## 2025-05-09 DIAGNOSIS — N81.84 PELVIC MUSCLE WASTING: ICD-10-CM

## 2025-05-09 DIAGNOSIS — N95.2 POSTMENOPAUSAL ATROPHIC VAGINITIS: ICD-10-CM

## 2025-05-09 DIAGNOSIS — Z98.890 POST-OPERATIVE STATE: Primary | ICD-10-CM

## 2025-05-09 PROCEDURE — 99212 OFFICE O/P EST SF 10 MIN: CPT

## 2025-05-09 RX ORDER — ESTRADIOL 0.1 MG/G
CREAM VAGINAL
Qty: 42 G | Refills: 3 | Status: SHIPPED | OUTPATIENT
Start: 2025-05-09

## 2025-05-09 RX ORDER — PREDNISOLONE ACETATE 10 MG/ML
1 SUSPENSION/ DROPS OPHTHALMIC 4 TIMES DAILY
COMMUNITY
Start: 2025-01-15

## 2025-05-09 RX ORDER — BRIMONIDINE TARTRATE AND TIMOLOL MALEATE 2; 5 MG/ML; MG/ML
1 SOLUTION OPHTHALMIC 2 TIMES DAILY
COMMUNITY

## 2025-05-09 NOTE — PROGRESS NOTES
She is s/p Post-Op Summary  Procedure Date: 04/04/22  Procedure Name: Vaginal Hysterectomy, Uterosacral Ligament Suspension, Enterocele Repair, Anterior and Posterior Repair, Cystoscopy, Bilateral Salpino-oophorectomy  Do you feel your surgery was successful?: Very successful  Compared to before surgery are you?: Much better  If you could go back to before your surgery, would you do it all over again?: Definitely yes  How satisfied are you with the results of your surgery?: Completely satisfied    Doing well   no complaints  Voids freely  No UTIs  BMs reg  No Pain  Vag estrogen twice weekly  Pelvic exercises daily  No leakage  No bulge  No dyspareunia  happy    Resp 16   Ht 68\"   Wt 147 lb (66.7 kg)   BMI 22.35 kg/m²     Gen: NAD  CV: RRR  Pulm:nl effort  Abd: soft  : tolerated vaginal exam. Suture site well healed. No active bleeding. Good support  PROLAPSE ASSESSMENT SCALE:                                                 Aa:-3 Ba:-3 C:-9   gh: pb: tvl:   Ap:-3 Bp:-3 D:9            Discussed mgmt of vulvovaginal atrophy with vaginal estrogen cream. Reviewed associated benefits, risks, alternatives, and goals. Recommend low dose twice weekly mgmt   Vag estrogen twice weekly     Discussed daily pelvic exercises  Call with s/sx of UTI     Plan for follow up in, one year return exam, sooner prn    All questions answered  She understands and agrees to plan    Kaila Kilpatrick, DO

## (undated) DEVICE — STANDARD HYPODERMIC NEEDLE,POLYPROPYLENE HUB: Brand: MONOJECT

## (undated) DEVICE — SOL H2O IV

## (undated) DEVICE — LONE STAR 5MM HOOKS LRG STAYS

## (undated) DEVICE — SUTURE VICRYL 0 CT-1

## (undated) DEVICE — MINOR GENERAL: Brand: MEDLINE INDUSTRIES, INC.

## (undated) DEVICE — SUTURE VICRYL 0 J340H

## (undated) DEVICE — SUTURE VICRYL 2-0 CT-1

## (undated) DEVICE — VIOLET BRAIDED (POLYGLACTIN 910), SYNTHETIC ABSORBABLE SUTURE: Brand: COATED VICRYL

## (undated) DEVICE — SUTURE VICRYL 1 CT-1

## (undated) DEVICE — DRAPE,UNDRBUT,WHT GRAD PCH,CAPPORT,20/CS: Brand: MEDLINE

## (undated) DEVICE — SPONGE LAP 4X18IN 7IN LOOP

## (undated) DEVICE — TUBING CYSTO TUR DUAL

## (undated) DEVICE — GAMMEX® PI HYBRID SIZE 6.5, STERILE POWDER-FREE SURGICAL GLOVE, POLYISOPRENE AND NEOPRENE BLEND: Brand: GAMMEX

## (undated) DEVICE — FLOSEAL HEMOSTATIC MATRIX, 5ML: Brand: FLOSEAL HEMOSTATIC MATRIX

## (undated) DEVICE — SOL  .9 1000ML BTL

## (undated) DEVICE — GAMMEX® NON-LATEX PI ORTHO SIZE 6.5, STERILE POLYISOPRENE POWDER-FREE SURGICAL GLOVE: Brand: GAMMEX

## (undated) DEVICE — MEDI-VAC NON-CONDUCTIVE SUCTION TUBING: Brand: CARDINAL HEALTH

## (undated) NOTE — Clinical Note
Christian Chi - I saw Km Kolb today with bulge. I've recommended bladder testing. I will work to manage her sx. I appreciate the opportunity to participate in her care.  Thanks, [employer]